# Patient Record
Sex: MALE | Race: WHITE
[De-identification: names, ages, dates, MRNs, and addresses within clinical notes are randomized per-mention and may not be internally consistent; named-entity substitution may affect disease eponyms.]

---

## 2020-06-08 ENCOUNTER — HOSPITAL ENCOUNTER (OUTPATIENT)
Dept: HOSPITAL 62 - OROUT | Age: 50
Discharge: HOME | End: 2020-06-08
Attending: INTERNAL MEDICINE
Payer: COMMERCIAL

## 2020-06-08 VITALS — SYSTOLIC BLOOD PRESSURE: 150 MMHG | DIASTOLIC BLOOD PRESSURE: 96 MMHG

## 2020-06-08 DIAGNOSIS — Z79.84: ICD-10-CM

## 2020-06-08 DIAGNOSIS — Z79.82: ICD-10-CM

## 2020-06-08 DIAGNOSIS — R73.03: ICD-10-CM

## 2020-06-08 DIAGNOSIS — Z79.899: ICD-10-CM

## 2020-06-08 DIAGNOSIS — Z12.11: Primary | ICD-10-CM

## 2020-06-08 DIAGNOSIS — K76.0: ICD-10-CM

## 2020-06-08 DIAGNOSIS — Z03.818: ICD-10-CM

## 2020-06-08 DIAGNOSIS — I10: ICD-10-CM

## 2020-06-08 PROCEDURE — 00812 ANES LWR INTST SCR COLSC: CPT

## 2020-06-08 PROCEDURE — 87635 SARS-COV-2 COVID-19 AMP PRB: CPT

## 2020-06-08 PROCEDURE — 82962 GLUCOSE BLOOD TEST: CPT

## 2020-06-08 PROCEDURE — 45378 DIAGNOSTIC COLONOSCOPY: CPT

## 2020-06-08 NOTE — OPERATIVE REPORT
Operative Report


DATE OF SURGERY: 06/08/20


Operative Report: 





The risk, benefits and alternatives of the procedure including the risk of 

bleeding, perforation requiring surgery have been explained to the patient in 

detail and informed consent has been obtained.  Patient is placed in a left, 

lateral decubital position.  Timeout was called.  Propofol medication is 

administered.  Rectal examination is done which did not reveal any masses, tears

or fissures.  An Olympus videoscope was introduced into the patient's rectum.  

Scope was then carefully advanced all the way to the cecum.  Cecum was 

identified by the usual anatomical landmarks including the ileocecal valve as 

well as the appendiceal office.  Photodocumentation is obtained.  Scope was then

sequentially pulled back via the various segments of the colon including the 

ascending colon, backslash, transverse colon, splenic flexure, descending colon 

and finally into the rectosigmoid portions of the colon.  Retroflexion maneuvers

performed.


PREOPERATIVE DIAGNOSIS: Colorectal cancer screening


POSTOPERATIVE DIAGNOSIS: Normal screening colonoscopy


OPERATION: Diagnostic colonoscopy


SURGEON: RAMANA LEUNG


ANESTHESIA: LMAC


TISSUE REMOVED OR ALTERED: None.


COMPLICATIONS: 





None.


ESTIMATED BLOOD LOSS: None.


INTRAOPERATIVE FINDINGS: As noted above.


PROCEDURE: 





Patient tolerated the procedure well.


No immediate postprocedure complications are noted.


Patient is discharged in good condition.


Discharge date 6/8/2020.  Discharge diet: Regular.  Discharge activity: Regular.

 2 to 3-week follow-up to discuss findings.  Patient is instructed to call the 

office or proceed to the emergency room should there be any further problems or 

questions.


10-year surveillance colonoscopy.